# Patient Record
Sex: MALE | Race: WHITE
[De-identification: names, ages, dates, MRNs, and addresses within clinical notes are randomized per-mention and may not be internally consistent; named-entity substitution may affect disease eponyms.]

---

## 2018-11-05 ENCOUNTER — HOSPITAL ENCOUNTER (EMERGENCY)
Dept: HOSPITAL 25 - UCEAST | Age: 38
Discharge: HOME | End: 2018-11-05
Payer: COMMERCIAL

## 2018-11-05 DIAGNOSIS — J32.9: Primary | ICD-10-CM

## 2018-11-05 PROCEDURE — 99202 OFFICE O/P NEW SF 15 MIN: CPT

## 2018-11-05 PROCEDURE — G0463 HOSPITAL OUTPT CLINIC VISIT: HCPCS

## 2018-11-05 NOTE — UC
Throat Pain/Nasal Russel HPI





- HPI Summary


HPI Summary: 





38-year-old male comes in to clinic today with chief complaint of runny nose 

fever cough.  This started 4 days ago.  He has a history of recurrent 

sinusitis.  His rhinorrhea has blood in.  He has postnasal drip which is making 

him cough quite a bit.  He is not a smoker.  He has tried steam which helped.  

he tried Benadryl which did not help.





- History of Current Complaint


Chief Complaint: UCGeneralIllness


Stated Complaint: COUGH SINUS CONGESTION


Time Seen by Provider: 11/05/18 07:52


Pain Intensity: 0





- Allergies/Home Medications


Allergies/Adverse Reactions: 


 Allergies











Allergy/AdvReac Type Severity Reaction Status Date / Time


 


No Known Allergies Allergy   Verified 11/05/18 07:50











Home Medications: 


 Home Medications





Dm/Acetaminophen/Doxylamine [Night Cold-Flu Relief Liq Gel] 2 tab PO DAILY PRN 

11/05/18 [History Confirmed 11/05/18]


Ibuprofen [Advil] 2 tab PO TID PRN 11/05/18 [History Confirmed 11/05/18]


Imipramine (NF) 2 tab PO DAILY 11/05/18 [History Confirmed 11/05/18]











PMH/Surg Hx/FS Hx/Imm Hx


Previously Healthy: Yes





- Family History


Known Family History: Positive: Diabetes





- Social History


Alcohol Use: Rare


Substance Use Type: None


Smoking Status (MU): Never Smoked Tobacco





- Immunization History


Most Recent Tetanus Shot: UNK





Review of Systems


Constitutional: Fever


Skin: Negative


Eyes: Negative


ENT: Sore Throat, Nasal Discharge, Sinus Congestion, Sinus Pain/Tenderness


Respiratory: Cough


Cardiovascular: Negative


Gastrointestinal: Negative


Motor: Negative


Neurovascular: Negative


Musculoskeletal: Negative


Neurological: Negative


Psychological: Negative


Is Patient Immunocompromised?: No


All Other Systems Reviewed And Are Negative: Yes





Physical Exam


Triage Information Reviewed: Yes


Appearance: No Pain Distress, Well-Nourished, Ill-Appearing - MILD


Vital Signs: 


 Initial Vital Signs











Temp  97.3 F   11/05/18 07:44


 


Pulse  93   11/05/18 07:44


 


Resp  21   11/05/18 07:44


 


BP  160/102   11/05/18 07:44


 


Pulse Ox  96   11/05/18 07:44











Vital Signs Reviewed: Yes


Eye Exam: Normal


ENT: Positive: Pharynx normal, Pharyngeal erythema, Nasal congestion, Nasal 

drainage, TMs normal


Neck exam: Normal


Neck: Positive: Supple


Respiratory: Positive: Lungs clear, Normal breath sounds, No respiratory 

distress, Other: - POSITIVE COUGH


Cardiovascular: Positive: RRR


Musculoskeletal Exam: Normal


Musculoskeletal: Positive: Strength Intact, ROM Intact


Neurological Exam: Normal


Neurological: Positive: Alert, Muscle Tone Normal


Psychological Exam: Normal


Psychological: Positive: Age Appropriate Behavior


Skin Exam: Normal





Throat Pain/Nasal Course/Dx





- Course


Course Of Treatment: DISCUSSED VIRAL VERSES BACTERIAL INFECTION AND THE ROLE OF 

ANTIBIOTICS. THE PATIENT WISHES TO BE ON ANTIBIOTICS AT THIS TIME.





- Differential Dx/Diagnosis


Provider Diagnoses: SINUSITIS





Discharge





- Sign-Out/Discharge


Documenting (check all that apply): Patient Departure


All imaging exams completed and their final reports reviewed: No Studies





- Discharge Plan


Condition: Stable


Disposition: HOME


Prescriptions: 


Amoxicillin/Clavulanate TAB* [Augmentin *] 875 mg PO BID #20 tab


Patient Education Materials:  Sinusitis (ED)


Referrals: 


Alejandro Almanzar MD [Primary Care Provider] - 


Additional Instructions: 


FOLLOW UP WITH YOUR DOCTOR IF NOT COMPLETELY IMPROVED.


GET RECHECKED FOR ANY WORSENING OF YOUR CONDITION OR QUESTIONS OR CONCERNS.








- Billing Disposition and Condition


Condition: STABLE


Disposition: Home